# Patient Record
Sex: MALE
[De-identification: names, ages, dates, MRNs, and addresses within clinical notes are randomized per-mention and may not be internally consistent; named-entity substitution may affect disease eponyms.]

---
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
## 2022-01-14 ENCOUNTER — HOSPITAL ENCOUNTER (EMERGENCY)
Dept: HOSPITAL 97 - ER | Age: 22
Discharge: HOME | End: 2022-01-14
Payer: SELF-PAY

## 2022-01-14 VITALS — DIASTOLIC BLOOD PRESSURE: 79 MMHG | SYSTOLIC BLOOD PRESSURE: 154 MMHG

## 2022-01-14 VITALS — TEMPERATURE: 98.4 F

## 2022-01-14 VITALS — OXYGEN SATURATION: 99 %

## 2022-01-14 DIAGNOSIS — F17.210: ICD-10-CM

## 2022-01-14 DIAGNOSIS — S61.213A: Primary | ICD-10-CM

## 2022-01-14 PROCEDURE — 0JQK0ZZ REPAIR LEFT HAND SUBCUTANEOUS TISSUE AND FASCIA, OPEN APPROACH: ICD-10-PCS

## 2022-01-14 PROCEDURE — 99283 EMERGENCY DEPT VISIT LOW MDM: CPT

## 2022-01-14 NOTE — RAD REPORT
EXAM DESCRIPTION:

CR - Finger-Thumb Left - 1/14/2022 8:21 pm

 

CLINICAL HISTORY:  Laceration

 

FINDINGS:  No fracture or dislocation seen.

 

A radiopaque foreign body is not visualized

## 2022-01-14 NOTE — EDPHYS
Physician Documentation                                                                           

 [facility]                                                                 

Name: Sin Peres                                                                               

Age: 22 yrs                                                                                       

Sex: Male                                                                                         

: 2000                                                                                   

MRN: [id number]                                                                                   

Arrival Date: 2022                                                                          

Time: 17:26                                                                                       

Account#: [id number]                                                                            

Bed 10                                                                                            

Private MD:                                                                                       

ED Physician Ayan Day                                                                       

HPI:                                                                                              

                                                                                             

18:45 This 22 yrs old  Male presents to ER via Ambulatory with complaints of Finger   cp  

      Injury - Lac.                                                                               

                                                                                                  

Historical:                                                                                       

- Allergies:                                                                                      

17:54 No Known Allergies;                                                                     vg1 

                                                                                                  

- Immunization history:: Client reports receiving the 2nd dose of the Covid vaccine.              

- Social history:: Smoking status: Patient reports the use of cigarette tobacco                   

  products, smokes one-half pack cigarettes per day.                                              

                                                                                                  

                                                                                                  

ROS:                                                                                              

18:50 Skin: Positive for laceration(s), of the madden side distal phalanx left middle finger. cp  

18:50 Constitutional: Negative for fever.                                                     cp  

18:50 MS/extremity: Negative for paresthesias.                                                    

18:50 All other systems are negative.                                                             

                                                                                                  

Exam:                                                                                             

19:00 Constitutional: The patient appears in no acute distress, alert, awake, well developed, cp  

      well nourished.                                                                             

19:00 Musculoskeletal/extremity: ROM: full active range of motion, in the left middle finger, cp  

      the  left middle finger Sensation intact. Tendon exam: specific tendon testing normal       

      through active and passive range of motion                                                  

19:00 Skin: injury, laceration(s), the wound is approximately  3 cm(s), of the madden side        

      distal phalanx left middle finger, that can be described as no foreign body, irregular,     

      with moderate bleeding.                                                                     

                                                                                                  

Vital Signs:                                                                                      

17:47  / 99; Pulse 76; Resp 18; Temp 98.4; Pulse Ox 100% ; Pain 7/10;                   vg1 

19:00  / 82; Pulse 86; Resp 17; Pulse Ox 99% ;                                          ab2 

20:01  / 79; Pulse 81; Resp 16; Pulse Ox 99% on R/A;                                    ab2 

                                                                                                  

Laceration:                                                                                       

20:50 Wound Repair of 3cm ( 1.2in ) subcutaneous laceration to madden side distal phalanx     cp  

      left middle finger. Irregularly shaped.. Distal neuro/vascular/tendon intact.               

      Anesthesia: Digital block administered with 6 mls of Lido/Marcaine. Wound prep:             

      Moderate cleansing by me, Wound irrigation by me. Skin closed with 7 5-0 Prolene using      

      interrupted sutures and sterile technique. Dressed with Bacitracin, 4x4's. Patient          

      tolerated well.                                                                             

                                                                                                  

MDM:                                                                                              

18:03 Patient medically screened.                                                             cp  

20:00 Differential diagnosis: open fracture, tendon injury, simple laceration.                cp  

20:53 Data reviewed: vital signs, nurses notes, radiologic studies, plain films.              cp  

20:53 Test interpretation: by ED physician or midlevel provider: plain radiologic studies.    cp  

      Counseling: I had a detailed discussion with the patient and/or guardian regarding: the     

      historical points, exam findings, and any diagnostic results supporting the                 

      discharge/admit diagnosis, radiology results. Response to treatment: the patient's          

      symptoms have markedly improved after treatment, and as a result, I will discharge          

      patient.                                                                                    

                                                                                                  

                                                                                             

20:07 Order name: XRAY Finger-Thumb Left: left middle; Complete Time: 20:52                   cp  

                                                                                             

20:52 Interpretation: Report reviewed.                                                        cp  

                                                                                             

18:35 Order name: Dressing - Wound                                                            cp  

                                                                                             

18:35 Order name: Gloves, Sterile                                                             cp  

                                                                                             

18:35 Order name: Setup Suture Tray; Complete Time: 18:56                                     cp  

                                                                                             

20:51 Order name: Finger Splint                                                               cp  

                                                                                             

20:51 Order name: Dressing - Wound                                                            cp  

                                                                                                  

Administered Medications:                                                                         

19:59 Drug: Lidocaine (1 %) 5 ml {Note: Given by SORAYA Chong during sutures.} Volume: 5 ml;      ab2 

      Route: Infiltration;                                                                        

19:59 Drug: Marcaine (bupivacaine) (0.5 %) 5 ml {Note: Given by SORAYA Chong during sutures.}     ab2 

      Volume: 10 ml; Route: Infiltration;                                                         

                                                                                                  

                                                                                                  

Disposition:                                                                                      

21:00 Chart complete.                                                                         cp  

                                                                                                  

Disposition Summary:                                                                              

22 20:53                                                                                    

Discharge Ordered                                                                                 

      Location: Home                                                                          cp  

      Problem: new                                                                            cp  

      Symptoms: have improved                                                                 cp  

      Condition: Stable                                                                       cp  

      Diagnosis                                                                                   

        - Laceration without foreign body of left middle finger without damage to nail        cp  

      Followup:                                                                               cp  

        - With: Private Physician                                                                  

        - When: 10 - 14 days                                                                       

        - Reason: Staple/Suture removal                                                            

      Discharge Instructions:                                                                     

        - Discharge Summary Sheet                                                             cp  

        - Laceration Care, Adult                                                              cp  

        - Sutured Wound Care                                                                  cp  

      Forms:                                                                                      

        - Medication Reconciliation Form                                                      cp  

        - Thank You Letter                                                                    cp  

        - Antibiotic Education                                                                cp  

        - Prescription Opioid Use                                                             cp  

Addendum:                                                                                         

2022                                                                                        

     07:06 Co-signature as Attending Physician, Ayan aDy MD I agree with the assessment and   k
dr

           plan of care.                                                                          

                                                                                                  

Signatures:                                                                                       

Dispatcher [employer]                           EDMS                                                 

Ayan Day MD MD   [facility]                                                  

Buster Chong PA PA cp Garcia, Victoria, RN                    RN   vg1                                                  

Long Jessica2                                                  

                                                                                                  

Corrections: (The following items were deleted from the chart)                                    

01/15                                                                                             

17:15  20:00 Wound Repair of 3cm ( 1.2in ) subcutaneous laceration to madden side distal cp  

      phalanx left middle finger. Irregularly shaped.. Distal neuro/vascular/tendon intact.       

      Anesthesia: Digital block administered with 6 mls of Lido/Marcaine. Wound prep:             

      Moderate cleansing by me, Wound irrigation by me. Skin closed with 7 5-0 Prolene using      

      interrupted sutures and sterile technique. Dressed with Bacitracin, 4x4's. Patient          

      tolerated well. cp                                                                          

                                                                                                  

**************************************************************************************************

## 2022-01-14 NOTE — XMS REPORT
Continuity of Care Document

                           Created on:2022



Patient:ROLAND CABELLO

Sex:Male

:2000

External Reference #:[id number]





Demographics







                          Address                   [address] [place] [address]

 

                          Home Phone                [phone]

 

                          Work Phone                [phone]

 

                          Mobile Phone              [phone]

 

                          Email Address             [email or web address]

 

                          Preferred Language        en

 

                          Marital Status            Unknown

 

                          Advent Affiliation     Unknown

 

                          Race                      Unknown

 

                          Additional Race(s)        White

 

                          Ethnic Group               or 









Author







                          Organization              [facility]

t

 

                          Address                   [facility] [address] [place] [address]

 

                          Phone                     [phone]









Support







                Name            Relationship    Address         Phone

 

                IRINEO FLYNN               [address] Unavailable



                                                [place] [address] 

 

                Bhatt           Life Partner    [address] [facility]  [phone]



                                                [place] [address] 









Care Team Providers







                    Name                Role                Phone

 

                    DAKOTAH Patel        Primary Care Physician [phone]

 

                    LUIS M CARLISLE          Attending Clinician Unavailable

 

                    LUIS M Salvador     Attending Clinician [phone]









Problems







       Condition Condition Condition Status Onset  Resolution Last   Treating Co

mments 

Source



       Name   Details Category        Date   Date   Treatment Clinician        



                                                 Date                 

 

       No known No known Disease                                           Unive

rs



       active active                                                  ity of



       problems problems                                                  [facility]







Allergies, Adverse Reactions, Alerts







       Allergy Allergy Status Severity Reaction(s) Onset  Inactive Treating Comm

ents 

Source



       Name   Type                        Date   Date   Clinician        

 

       NO KNOWN Drug   Active                                           [facility]



       ALLERGIE Class                                                   ity of



       S                                                              [facility]







Social History







           Social Habit Start Date Stop Date  Quantity   Comments   Source

 

           Exposure to                       Not sure              [facility]

 [place]



           SARS-CoV-2 (event)                                             [facility]

l Branch

 

           Alcohol intake 2014                       [facility]

 [facility] [place]



                      00:00:00   00:00:00                         [facility]

 

           Sex Assigned At 2000                       [facility]



           Birth      00:00:00   00:00:00                         [facility]









                Smoking Status  Start Date      Stop Date       Source

 

                Never smoker                                    [facility]







Medications







       Ordered Filled Start  Stop   Current Ordering Indication Dosage Frequency

 Signature

                    Comments            Components          Source



     Medication Medication Date Date Medication? Clinician                (SIG) 

          



     Name Name                                                   

 

     lidocaine      2021- No             5mL       5 mL,           Univer

s



     1%                                  Infiltrati           ity of



     (XYLOCAINE)      02:30: 01:25                          on, ONCE,           

[place]



     10 mg/mL (1      00   :00                           1 dose, On           Me

dical



     %)                                           Fri            Branch



     injection 5                                         /           



     mL                                           at 2030,           



                                                  ASAP           

 

     cephALEXin      2021- Yes       [id number] 500mg      Take 1       

    [facility]



     500 mg                 [id number]           capsule by           ity 

of



     capsule      00:00: 05:59                          mouth 4           [place]



               00   :00                           (four)           Medical



                                                  times           [place]



                                                  daily for           



                                                  5 days.           

 

     amLODIPine      2014      Yes                      TAKE 1           Unive

rs



     (NORVASC)      2-16                               TABLET BY           ity o

f



     2.5 mg      00:00:                               MOUTH           [place]



     tablet      00                                 EVERY DAY           Medical



                                                                 Branch

 

     ALBUTEROL            Yes                      Inhale.           Unive

rs



     INHALE                                                    ity of



               09:52:                                              [place]



               54                                                Medical



                                                                 Branch

 

     tretinoin            Yes       [id number]           Apply  to          

 [facility]



     (RETIN-A)                                     affected           ity of



     0.05 %      00:00:                               area(s) at           [place]



     cream      00                                 bedtime.           Medical



                                                                 Branch

 

     tretinoin            Yes       [id number]           Apply  to          

 [facility]



     (RETIN-A)                                     affected           ity of



     0.025 %      00:00:                               area(s) at           [facility]

s



     cream      00                                 bedtime.           [facility]







Immunizations







           Ordered    Filled Immunization Date       Status     Comments   [facility]

e



           Immunization Name Name                                        

 

           Td                    2021 Completed             [facility]



                                 00:00:00                         [facility]







Vital Signs







             Vital Name   Observation Time Observation Value Comments     Source

 

             Systolic blood 2021 01:14:00 183 mm[Hg]                [facility]

sity of



             pressure                                            [facility]

 

             Diastolic blood 2021 01:14:00 80 mm[Hg]                 Unive

ity [facility]

 

             Heart rate   2021 01:14:00 75 /min                   [facility]

 

             Body temperature 2021 01:14:00 37.5 Dnea                  [facility]

 

             Respiratory rate 2021 01:14:00 18 /min                   [facility]

 

             Body height  2021 01:14:00 167.6 cm                  [facility]

 

             Body weight  2021 01:14:00 127.007 kg                [facility]

 

             BMI          2021 01:14:00 45.19 kg/m2               [facility]

 

             Oxygen saturation in 2021 01:14:00 100 /min                  

[facility] 



             Arterial blood by                                        [place] [facility]

ray



             Pulse oximetry                                        Branch







Procedures







                Procedure       Date / Time Performed Performing Clinician Gunner stephen

 

                NOTICE OF PRIVACY 2021 01:25:29 Doctor Unassigned, No Univ

[facility]



                PRACTICES                       Name            Medical Branch

 

                CONSENT/REFUSAL FOR 2021 00:47:44 Doctor Unassigned, No [facility]



                DI[facility]NOS[facility] AND                   Name            Medical Branch



                TREATMENT                                       







Encounters







        Start   End     Encounter Admission Attending Care    Care    Encounter 

Source



        Date/Time Date/Time Type    Type    Clinicians Facility Department ID   

   

 

        2021 Emergency X       [facility]    ERT     [id number]

33 Univers



        19:20:00 20:45:00                 ALINE sinclair of



                                                                        [facility]

 

        2021 Emergency         [facility]    [id number]

[id number] Univers



        19:20:00 20:45:00                 Aline MELLO [id number]         i

ty of



                                                [place] [id number]         [facility]  [phone]         Medi

ray



                                                        [address]             Branch







Results

This patient has no known results.

## 2022-01-14 NOTE — ER
Nurse's Notes                                                                                     

 [facility]                                                                 

Name: Sin Peres                                                                               

Age: 22 yrs                                                                                       

Sex: Male                                                                                         

: 2000                                                                                   

MRN: [id number]                                                                                   

Arrival Date: 2022                                                                          

Time: 17:26                                                                                       

Account#: [id number]                                                                            

Bed 10                                                                                            

Private MD:                                                                                       

Diagnosis: Laceration without foreign body of left middle finger without damage to nail           

                                                                                                  

Presentation:                                                                                     

                                                                                             

17:47 Chief complaint: Patient states: cutting vegetables at work and cut Left middle finger  vg1 

      with a knife. Coronavirus screen: Vaccine status: Patient reports receiving the 2nd         

      dose of the covid vaccine. Client denies travel out of the [place] in the last 14 days.        

      Ebola Screen: Patient negative for fever greater than or equal to 101.5 degrees             

      Fahrenheit, and additional compatible Ebola Virus Disease symptoms. Initial Sepsis          

      Screen: Does the patient meet any 2 criteria? No. Patient's initial sepsis screen is        

      negative. Does the patient have a suspected source of infection? No. Patient's initial      

      sepsis screen is negative. Risk Assessment: Do you want to hurt yourself or someone         

      else? Patient reports no desire to harm self or others. Onset of symptoms was 2022.                                                                                   

17:47 Method Of Arrival: Ambulatory                                                           vg1 

17:47 Acuity: LATOYA 4                                                                           vg1 

                                                                                                  

Triage Assessment:                                                                                

17:54 General: Appears in no apparent distress. uncomfortable, Behavior is calm, cooperative. vg1 

      Pain: Complains of pain in palmar aspect of distal phalanx of left middle finger.           

      Musculoskeletal: Circulation, motion, and sensation intact. Injury Description:             

      Laceration sustained to palmar aspect of distal phalanx of left middle finger is            

      bleeding moderately.                                                                        

                                                                                                  

Historical:                                                                                       

- Allergies:                                                                                      

17:54 No Known Allergies;                                                                     vg1 

                                                                                                  

- Immunization history:: Client reports receiving the 2nd dose of the Covid vaccine.              

- Social history:: Smoking status: Patient reports the use of cigarette tobacco                   

  products, smokes one-half pack cigarettes per day.                                              

                                                                                                  

                                                                                                  

Screenin:11 Abuse screen: Denies threats or abuse. Denies injuries from another. Nutritional        ab2 

      screening: No deficits noted. Tuberculosis screening: No symptoms or risk factors           

      identified. Fall Risk None identified.                                                      

                                                                                                  

Assessment:                                                                                       

18:08 General: Appears in no apparent distress. comfortable, Behavior is calm, cooperative.   ab2 

      Pain: Complains of pain in palmar aspect of distal phalanx of left index finger Pain        

      currently is 9 out of 10 on a pain scale. Neuro: Level of Consciousness is awake,           

      alert, obeys commands, Oriented to person, place, time, situation, Appropriate for age      

       are equal bilaterally Moves all extremities. Gait is steady, Speech is normal.        

      Cardiovascular: No deficits noted. Reports Denies chest pain, shortness of breath.          

      Respiratory: Airway is patent Breath sounds are clear bilaterally. GI: No deficits          

      noted. No signs and/or symptoms were reported involving the gastrointestinal system.        

      : No deficits noted. No signs and/or symptoms were reported regarding the                 

      genitourinary system. EENT: No deficits noted. No signs and/or symptoms were reported       

      regarding the EENT system. Derm: No deficits noted. No signs and/or symptoms reported       

      regarding the dermatologic system. Musculoskeletal: No deficits noted. No signs and/or      

      symptoms reported regarding the musculoskeletal system. Injury Description: Laceration      

      sustained to palmar aspect of distal phalanx of left index finger.                          

                                                                                                  

Vital Signs:                                                                                      

17:47  / 99; Pulse 76; Resp 18; Temp 98.4; Pulse Ox 100% ; Pain 7/10;                   vg1 

19:00  / 82; Pulse 86; Resp 17; Pulse Ox 99% ;                                          ab2 

20:01  / 79; Pulse 81; Resp 16; Pulse Ox 99% on R/A;                                    ab2 

                                                                                                  

ED Course:                                                                                        

17:26 Patient arrived in ED.                                                                  ds1 

17:54 Triage completed.                                                                       vg1 

17:54 Arm band placed on.                                                                     vg1 

18:01 Buster Chong PA is PHCP.                                                                cp  

18:01 Ayan Day MD is Attending Physician.                                              cp  

18:08 Long Jessica is Primary Nurse.                                                    ab2 

18:11 Patient has correct armband on for positive identification. Call light in reach. Side   ab2 

      rails up X2.                                                                                

18:11 No provider procedures requiring assistance completed.                                  ab2 

20:22 XRAY Finger-Thumb Left: left middle In Process Unspecified.                             EDMS

21:09 Patient did not have IV access during this emergency room visit.                        ab2 

                                                                                                  

Administered Medications:                                                                         

19:59 Drug: Lidocaine (1 %) 5 ml {Note: Given by SORAYA Chong during sutures.} Volume: 5 ml;      ab2 

      Route: Infiltration;                                                                        

19:59 Drug: Marcaine (bupivacaine) (0.5 %) 5 ml {Note: Given by SORAYA Chong during sutures.}     ab2 

      Volume: 10 ml; Route: Infiltration;                                                         

                                                                                                  

                                                                                                  

Outcome:                                                                                          

20:53 Discharge ordered by MD.                                                                vickie  

21: Discharged to home ambulatory.                                                          ab2 

21: Condition: stable                                                                           

21: Discharge instructions given to patient, Instructed on discharge instructions, follow       

      up and referral plans. Demonstrated understanding of instructions, follow-up care.          

21:09 Patient left the ED.                                                                    ab2 

                                                                                                  

Signatures:                                                                                       

Dispatcher [employer]                           [facility]                                                 

Ana Luisa Clemens                                ds1                                                  

Buster Chong PA PA cp Garcia, Victoria, RN                    RN   vg1                                                  

Long Jessica                           ab2                                                  

                                                                                                  

**************************************************************************************************

## 2022-11-16 ENCOUNTER — HOSPITAL ENCOUNTER (EMERGENCY)
Dept: HOSPITAL 97 - ER | Age: 22
Discharge: HOME | End: 2022-11-16
Payer: SELF-PAY

## 2022-11-16 VITALS — DIASTOLIC BLOOD PRESSURE: 79 MMHG | OXYGEN SATURATION: 98 % | TEMPERATURE: 97.8 F | SYSTOLIC BLOOD PRESSURE: 158 MMHG

## 2022-11-16 DIAGNOSIS — I10: Primary | ICD-10-CM

## 2022-11-16 PROCEDURE — 99282 EMERGENCY DEPT VISIT SF MDM: CPT

## 2022-11-16 NOTE — ER
Nurse's Notes                                                                                     

 [facility]                                                                 

Name: Sin Peres                                                                               

Age: 22 yrs                                                                                       

Sex: Male                                                                                         

: 2000                                                                                   

MRN: [id number]                                                                                   

Arrival Date: 2022                                                                          

Time: 13:19                                                                                       

Account#: [id number]                                                                            

Bed Waiting                                                                                       

Private MD:                                                                                       

Diagnosis: Essential (primary) hypertension                                                       

                                                                                                  

Presentation:                                                                                     

                                                                                             

13:24 Chief complaint: Patient states: HA and nose bleeds for the past two weeks. States he   ll1 

      knows he has high BP, but doesn't take meds for past 4 years. Coronavirus screen:           

      Vaccine status: Client denies travel out of the [place] in the last 14 days. At this time,     

      the client does not indicate any symptoms associated with coronavirus-19. Ebola Screen:     

      Patient denies travel to an Ebola-affected area in the 21 days before illness onset.        

      Initial Sepsis Screen: Does the patient meet any 2 criteria? No. Patient's initial          

      sepsis screen is negative. Does the patient have a suspected source of infection? No.       

      Patient's initial sepsis screen is negative. Risk Assessment: Do you want to hurt           

      yourself or someone else? Patient reports no desire to harm self or others. Onset of        

      symptoms was 2022.                                                              

13:24 Method Of Arrival: Ambulatory                                                           ll1 

13:24 Acuity: LATOYA 4                                                                           ll1 

                                                                                                  

Triage Assessment:                                                                                

13:27 General: Appears in no apparent distress. Behavior is calm, cooperative, appropriate    ll1 

      for age. Pain: Complains of pain in head Quality of pain is described as aching. Neuro:     

      Reports headache nose bleeds.                                                               

                                                                                                  

Historical:                                                                                       

- Allergies:                                                                                      

13:27 No Known Drug Allergies;                                                                ll1 

- PMHx:                                                                                           

13:27 Hypertensive disorder;                                                                  ll1 

- PSHx:                                                                                           

13:27 None;                                                                                   ll1 

                                                                                                  

- Social history:: Smoking status: Patient denies any tobacco usage or history of.                

                                                                                                  

                                                                                                  

Screenin:46 Abuse screen: Denies threats or abuse. Nutritional screening: No deficits noted.        ll1 

      Tuberculosis screening: No symptoms or risk factors identified. Fall Risk Total Calix       

      Fall Scale indicates No Risk (0-24 pts).                                                    

                                                                                                  

Assessment:                                                                                       

13:45 Reassessment: No changes from previously documented assessment. Patient and/or family   ll1 

      updated on plan of care and expected duration. Pain level reassessed. Patient is alert,     

      oriented x 3, equal unlabored respirations, skin warm/dry/pink.                             

                                                                                                  

Vital Signs:                                                                                      

13:24  / 79; Pulse 75; Resp 17; Temp 97.8; Pulse Ox 98% ; Pain 0/10;                    ll1 

                                                                                                  

ED Course:                                                                                        

13:19 Patient arrived in ED.                                                                  am2 

13:27 Triage completed.                                                                       ll1 

13:27 Arm band placed on.                                                                     ll1 

13:30 Franci Brooks FNP-C is [facility].                                                        kb  

13:30 Buster Gardner MD is Attending Physician.                                             kb  

13:52 Patient has correct armband on for positive identification. Bed in low position. Call   ll1 

      light in reach. Side rails up X 1. Cardiac monitoring not applicable on this patient.       

13:52 No provider procedures requiring assistance completed. Patient did not have IV access   ll1 

      during this emergency room visit.                                                           

                                                                                                  

Administered Medications:                                                                         

No medications were administered                                                                  

                                                                                                  

                                                                                                  

Medication:                                                                                       

13:52 VIS not applicable for this client.                                                     ll1 

                                                                                                  

Outcome:                                                                                          

13:30 Discharge ordered by MD.                                                                kb  

13:46 Patient left the ED.                                                                    ll1 

13:46 Discharged to home ambulatory.                                                          ll1 

13:46 Condition: stable                                                                           

13:46 Discharge instructions given to patient, Instructed on discharge instructions, follow       

      up and referral plans. Demonstrated understanding of instructions, follow-up care,          

      medications, Prescriptions given X 1.                                                       

                                                                                                  

Signatures:                                                                                       

Franci Brooks FNP-C                 FNKAIDEN-Flora Kaye                               am2                                                  

Redd Gayle RN                       RN   ll1                                                  

                                                                                                  

Corrections: (The following items were deleted from the chart)                                    

13:29 13:24 Acuity: LATOYA 3 ll1                                                                 ll1 

                                                                                                  

**************************************************************************************************

## 2022-11-16 NOTE — EDPHYS
Physician Documentation                                                                           

 [facility]                                                                 

Name: Sin Peres                                                                               

Age: 22 yrs                                                                                       

Sex: Male                                                                                         

: 2000                                                                                   

MRN: [id number]                                                                                   

Arrival Date: 2022                                                                          

Time: 13:19                                                                                       

Account#: [id number]                                                                            

Bed Waiting                                                                                       

Private MD:                                                                                       

JOCELINE Physician Buster Gardner                                                                      

HPI:                                                                                              

                                                                                             

14:03 This 22 yrs old  Male presents to ER via Ambulatory with complaints of High     kb  

      Blood Pressure, Nose Bleed.                                                                 

14:03 The patient has elevated blood pressure and discovered this "felt like it was high".    kb  

      Onset: The symptoms/episode began/occurred 2 week(s) ago. Associated signs and              

      symptoms: Pertinent positives: headache. Severity of symptoms: At its worst the blood       

      pressure was moderate. The patient has not experienced similar symptoms in the past. Pt     

      states he has a history of hypertension, but stopped taking his meds about 4 years ago.     

      states he hasn't followed up since then. Started having intermittent nosebleeds and         

      headache 2 weeks ago and reports that is what was happening when his blood pressure was     

      up in the past so he knows that is the problem now. States he was sent home from work       

      on Monday for a headache. Denies any symptoms at this time. States "I came in today         

      because I overslept and missed work so I need a note.".                                     

                                                                                                  

Historical:                                                                                       

- Allergies:                                                                                      

13:27 No Known Drug Allergies;                                                                ll1 

- PMHx:                                                                                           

13:27 Hypertensive disorder;                                                                  ll1 

- PSHx:                                                                                           

13:27 None;                                                                                   ll1 

                                                                                                  

- Social history:: Smoking status: Patient denies any tobacco usage or history of.                

                                                                                                  

                                                                                                  

ROS:                                                                                              

14:02 Constitutional: Negative for fever, chills, and weight loss.                            kb  

14:02 All other systems are negative.                                                             

                                                                                                  

Exam:                                                                                             

14:02 Constitutional:  This is a well developed, well nourished patient who is awake, alert,  kb  

      and in no acute distress. Head/Face:  Normocephalic, atraumatic. ENT:  Moist Mucous         

      membranes Cardiovascular:  Regular rate and rhythm with a normal S1 and S2.  No             

      gallops, murmurs, or rubs.  No pulse deficits. Respiratory:  Respirations even and          

      unlabored. No increased work of breathing. Talking in full sentences Abdomen/GI:  Soft,     

      non-tender. No distention Skin:  Warm, dry with normal turgor.  Normal color. MS/           

      Extremity:  Pulses equal, no cyanosis.  Neurovascular intact.  Full, normal range of        

      motion. Neuro:  Awake and alert, GCS 15, oriented to person, place, time, and               

      situation. Moves all extremities. Normal gait.                                              

                                                                                                  

Vital Signs:                                                                                      

13:24  / 79; Pulse 75; Resp 17; Temp 97.8; Pulse Ox 98% ; Pain 0/10;                    ll1 

                                                                                                  

MDM:                                                                                              

13:30 Patient medically screened.                                                             kb  

14:02 Data reviewed: vital signs, nurses notes. Data interpreted: Pulse oximetry: on room air kb  

      is 98 %. Interpretation: normal. Counseling: I had a detailed discussion with the           

      patient and/or guardian regarding: the historical points, exam findings, and any            

      diagnostic results supporting the discharge/admit diagnosis, the need for outpatient        

      follow up, a family practitioner, to return to the emergency department if symptoms         

      worsen or persist or if there are any questions or concerns that arise at home.             

                                                                                                  

Administered Medications:                                                                         

No medications were administered                                                                  

                                                                                                  

                                                                                                  

Disposition:                                                                                      

                                                                                             

09:28 Co-signature as Attending Physician, Buster Gardner MD I agree with the assessment and  abby 

      plan of care.                                                                               

                                                                                                  

Disposition Summary:                                                                              

22 13:30                                                                                    

Discharge Ordered                                                                                 

      Location: Home                                                                          kb  

      Condition: Stable                                                                       kb  

      Diagnosis                                                                                   

        - Essential (primary) hypertension                                                    kb  

      Followup:                                                                               kb  

        - With: Emergency Department                                                               

        - When: As needed                                                                          

        - Reason: Worsening of condition                                                           

      Followup:                                                                               kb  

        - With: Private Physician                                                                  

        - When: 2 - 3 days                                                                         

        - Reason: Recheck today's complaints, Continuance of care, Re-evaluation by your           

      physician                                                                                   

      Discharge Instructions:                                                                     

        - Hypertension, Adult, Easy-to-Read                                                   kb  

        - How to Take Your Blood Pressure, Easy-to-Read                                       kb  

        - Discharge Summary Sheet                                                             ll1 

      Forms:                                                                                      

        - Medication Reconciliation Form                                                      kb  

        - Work release form                                                                   ll1 

        - Thank You Letter                                                                    kb  

        - Antibiotic Education                                                                kb  

        - Prescription Opioid Use                                                             kb  

      Prescriptions:                                                                              

        - Hydrochlorothiazide 25 mg Oral Tablet                                                    

            - take 1 tablet by ORAL route once daily .; 30 tablet; Refills: 0, Product        kb  

      Selection Permitted                                                                         

Signatures:                                                                                       

Franci Brooks, ANTONY-MARIA ISABEL HARDY-Buster Dahl MD MD cha Lewis, Lynsay, RN                       RN   ll1                                                  

                                                                                                  

**************************************************************************************************

## 2023-09-21 NOTE — XMS REPORT
Continuity of Care Document

                          Created on:2022



Patient:ROLAND CABELLO

Sex:Male

:2000

External Reference #:[id number]





Demographics







                          Address                   [address] [place] [address]

 

                          Home Phone                [phone]

 

                          Work Phone                [phone]

 

                          Mobile Phone              [phone]

 

                          Email Address             [email or web address]

 

                          Preferred Language        en

 

                          Marital Status            Unknown

 

                          Muslim Affiliation     Unknown

 

                          Race                      Unknown

 

                          Additional Race(s)        Unavailable



                                                    White

 

                          Ethnic Group              Unknown









Author







                          Organization              [facility]

t

 

                          Address                   [address] [place] [address] [address]



                                                    [place] [address]

 

                          Phone                     [phone]









Support







                Name            Relationship    Address         Phone

 

                MADDIE CABELLO               [address] Unavailable



                                                [place] [address] 

 

                Rosana Bhatt   Life Partner    [address] [facility]  [phone]



                                                [place] [address] 









Care Team Providers







                    Name                Role                Phone

 

                    Beth Patel    Primary Care Physician [phone]

 

                    ALINE CARLISLE     Attending Clinician Unavailable

 

                    Aline Salvador Attending Clinician [phone]









Problems







       Condition Condition Condition Status Onset  Resolution Last   Treating Co

mments 

Source



       Name   Details Category        Date   Date   Treatment Clinician        



                                                 Date                 

 

       No known No known Disease                                           Un[facility]

rs



       active active                                                  ity of



       problems problems                                                  [facility]







Allergies, Adverse Reactions, Alerts







       Allergy Allergy Status Severity Reaction(s) Onset  Inactive Treating Comm

ents 

Source



       Name   Type                        Date   Date   Clinician        

 

       NO KNOWN Drug   Active                                           [facility]



       ALLERGIE Class                                                   ity of



       S                                                              [facility]







Social History







           Social Habit Start Date Stop Date  Quantity   Comments   Source

 

           Exposure to                       Not sure              [facility]

 [place]



           SARS-CoV-2 (event)                                             [facility]

l [facility]

 

           Alcohol intake 2014                       [facility]

 [facility] [place]



                      00:00:00   00:00:00                         [facility]

 

           Sex Assigned At 2000                       [facility]



           Birth      00:00:00   00:00:00                         [facility]









                Smoking Status  Start Date      Stop Date       Source

 

                Never smoker                                    [facility]







Medications







       Ordered Filled Start  Stop   Current Ordering Indication Dosage Frequency

 Signature

                    Comments            Components          Source



     Medication Medication Date Date Medication? Clinician                (SIG) 

          



     Name Name                                                   

 

     lidocaine      2021- No             5mL       5 mL,           Univer

s



     1%        1-27 11-27                          Infiltrati           ity of



     (XYLOCAINE)      02:30: 01:25                          on, ONCE,           

[place]



     10 mg/mL (1      00   :00                           1 dose, On           Me

dical



     %)                                           Fri            Branch



     injection 5                                         /           



     mL                                           at 2030,           



                                                  ASAP           

 

     cephALEXin      2021- No        [id number] 500mg      Take 1       

    [facility]



     500 mg                 [id number]           capsule by           ity 

of



     capsule      00:00: 05:59                          mouth 4           [place]



               00   :00                           (four)           Medical



                                                  times           Branch



                                                  daily for           



                                                  5 days.           

 

     amLODIPine      2014      Yes                      TAKE 1           Unive

rs



     (NORVASC)      2-16                               TABLET BY           ity o

f



     2.5 mg      00:00:                               MOUTH           [place]



     tablet      00                                 EVERY DAY           Medical



                                                                 Branch

 

     ALBUTEROL            Yes                      Inhale.           Unive

rs



     INHALE                                                    ity of



               09:52:                                              [place]



               54                                                Medical



                                                                 Branch

 

     tretinoin            Yes       [id number]           Apply to           

[facility]



     (RETIN-A)                                     affected           ity of



     0.05 %      00:00:                               area(s) at           [place]



     cream      00                                 bedtime.           Medical



                                                                 Branch

 

     tretinoin            Yes       [id number]           Apply to           

[facility]



     (RETIN-A)                                     affected           ity of



     0.025 %      00:00:                               area(s) at           [facility]

[facility]



     cream      00                                 bedtime.           [facility]







Immunizations







           Ordered    Filled Immunization Date       Status     Comments   [facility]

e



           Immunization Name Name                                        

 

           Td                    2021 Completed             [facility] 



                                 00:00:00                         [facility]







Vital Signs







             Vital Name   Observation Time Observation Value Comments     Source

 

             Systolic blood 2021 01:14:00 183 mm[Hg]                [facility]

sity of



             pressure                                            [facility]

 

             Diastolic blood 2021 01:14:00 80 mm[Hg]                 Unive

rsity of



             pressure                                            [facility]

 

             Heart rate   2021 01:14:00 75 /min                   [facility]

 

             Body temperature 2021 01:14:00 37.5 Dena                  [facility]

 

             Respiratory rate 2021 01:14:00 18 /min                   [facility]

 

             Body height  2021 01:14:00 167.6 cm                  [facility]

 

             Body weight  2021 01:14:00 127.007 kg                [facility]

 

             BMI          2021 01:14:00 45.19 kg/m2               [facility]

ty of



                                                                 [facility]

 

             Oxygen saturation in 2021 01:14:00 100 /min                  

[facility] 



             Arterial blood by                                        [place] [facility]

ray



             Pulse oximetry                                        Branch







Procedures







                Procedure       Date / Time Performed Performing Clinician Sourc

e

 

                NOTICE OF PRIVACY 2021 01:25:29 Doctor Unassigned, No Univ

[facility]



                PRACTICES                       Name            Medical Branch

 

                CONSENT/REFUSAL FOR 2021 00:47:44 Doctor Unassigned, No [facility]



                [facility]NOS[facility] AND                   Name            Medical Branch



                TREATMENT                                       







Encounters







        Start   End     Encounter Admission Attending Care    Care    Encounter 

Source



        Date/Time Date/Time Type    Type    Clinicians Facility Department ID   

   

 

        2021 Emergency X       [facility]    ERT     [id number]

[id number] [facility]



        19:20:00 20:45:00                 ALINE sinclair of



                                                                        [facility]

 

        2021 Emergency         [facility]    [phone] [id number]

[id number] Univers



        19:20:00 20:45:00                 Aline MELLO [id number]         i

ty of



                                                [facility] [id number]         [facility]  [phone]         Medi

ray



                                                        [address]             Branch







Results

This patient has no known results.
patient